# Patient Record
Sex: FEMALE | Employment: UNEMPLOYED | URBAN - METROPOLITAN AREA
[De-identification: names, ages, dates, MRNs, and addresses within clinical notes are randomized per-mention and may not be internally consistent; named-entity substitution may affect disease eponyms.]

---

## 2024-02-02 ENCOUNTER — HOSPITAL ENCOUNTER (EMERGENCY)
Facility: HOSPITAL | Age: 34
Discharge: HOME | End: 2024-02-03
Attending: EMERGENCY MEDICINE | Admitting: EMERGENCY MEDICINE
Payer: MEDICAID

## 2024-02-02 VITALS
HEART RATE: 99 BPM | SYSTOLIC BLOOD PRESSURE: 146 MMHG | HEIGHT: 66 IN | OXYGEN SATURATION: 98 % | BODY MASS INDEX: 44.2 KG/M2 | TEMPERATURE: 101.2 F | RESPIRATION RATE: 18 BRPM | WEIGHT: 275 LBS | DIASTOLIC BLOOD PRESSURE: 81 MMHG

## 2024-02-02 DIAGNOSIS — R55 VASOVAGAL SYNCOPE: ICD-10-CM

## 2024-02-02 DIAGNOSIS — J02.0 STREP PHARYNGITIS: ICD-10-CM

## 2024-02-02 LAB
ALBUMIN SERPL BCG-MCNC: 4.1 G/DL (ref 3.5–5.2)
ALP SERPL-CCNC: 106 U/L (ref 40–150)
ALT SERPL W P-5'-P-CCNC: 16 U/L (ref 0–50)
ANION GAP SERPL CALCULATED.3IONS-SCNC: 13 MMOL/L (ref 7–15)
AST SERPL W P-5'-P-CCNC: 22 U/L (ref 0–45)
BASOPHILS # BLD AUTO: 0 10E3/UL (ref 0–0.2)
BASOPHILS NFR BLD AUTO: 0 %
BILIRUB DIRECT SERPL-MCNC: <0.2 MG/DL (ref 0–0.3)
BILIRUB SERPL-MCNC: 0.7 MG/DL
BUN SERPL-MCNC: 11 MG/DL (ref 6–20)
CALCIUM SERPL-MCNC: 8.8 MG/DL (ref 8.6–10)
CHLORIDE SERPL-SCNC: 102 MMOL/L (ref 98–107)
CREAT SERPL-MCNC: 1.14 MG/DL (ref 0.51–0.95)
DEPRECATED HCO3 PLAS-SCNC: 25 MMOL/L (ref 22–29)
EGFRCR SERPLBLD CKD-EPI 2021: 65 ML/MIN/1.73M2
EOSINOPHIL # BLD AUTO: 0 10E3/UL (ref 0–0.7)
EOSINOPHIL NFR BLD AUTO: 0 %
ERYTHROCYTE [DISTWIDTH] IN BLOOD BY AUTOMATED COUNT: 13.7 % (ref 10–15)
FLUAV RNA SPEC QL NAA+PROBE: NEGATIVE
FLUBV RNA RESP QL NAA+PROBE: NEGATIVE
GLUCOSE SERPL-MCNC: 105 MG/DL (ref 70–99)
GROUP A STREP BY PCR: DETECTED
HCG SERPL QL: NEGATIVE
HCT VFR BLD AUTO: 41.6 % (ref 35–47)
HGB BLD-MCNC: 14.2 G/DL (ref 11.7–15.7)
HOLD SPECIMEN: NORMAL
IMM GRANULOCYTES # BLD: 0.1 10E3/UL
IMM GRANULOCYTES NFR BLD: 1 %
LYMPHOCYTES # BLD AUTO: 0.8 10E3/UL (ref 0.8–5.3)
LYMPHOCYTES NFR BLD AUTO: 8 %
MCH RBC QN AUTO: 26.2 PG (ref 26.5–33)
MCHC RBC AUTO-ENTMCNC: 34.1 G/DL (ref 31.5–36.5)
MCV RBC AUTO: 77 FL (ref 78–100)
MONOCYTES # BLD AUTO: 0.7 10E3/UL (ref 0–1.3)
MONOCYTES NFR BLD AUTO: 7 %
NEUTROPHILS # BLD AUTO: 8.2 10E3/UL (ref 1.6–8.3)
NEUTROPHILS NFR BLD AUTO: 84 %
NRBC # BLD AUTO: 0 10E3/UL
NRBC BLD AUTO-RTO: 0 /100
PLATELET # BLD AUTO: 238 10E3/UL (ref 150–450)
POTASSIUM SERPL-SCNC: 3.3 MMOL/L (ref 3.4–5.3)
PROT SERPL-MCNC: 8 G/DL (ref 6.4–8.3)
RBC # BLD AUTO: 5.42 10E6/UL (ref 3.8–5.2)
RSV RNA SPEC NAA+PROBE: NEGATIVE
SARS-COV-2 RNA RESP QL NAA+PROBE: NEGATIVE
SODIUM SERPL-SCNC: 140 MMOL/L (ref 135–145)
WBC # BLD AUTO: 9.7 10E3/UL (ref 4–11)

## 2024-02-02 PROCEDURE — 99284 EMERGENCY DEPT VISIT MOD MDM: CPT | Mod: 25

## 2024-02-02 PROCEDURE — 258N000003 HC RX IP 258 OP 636: Performed by: EMERGENCY MEDICINE

## 2024-02-02 PROCEDURE — 96374 THER/PROPH/DIAG INJ IV PUSH: CPT

## 2024-02-02 PROCEDURE — 250N000011 HC RX IP 250 OP 636: Performed by: STUDENT IN AN ORGANIZED HEALTH CARE EDUCATION/TRAINING PROGRAM

## 2024-02-02 PROCEDURE — 250N000013 HC RX MED GY IP 250 OP 250 PS 637: Performed by: EMERGENCY MEDICINE

## 2024-02-02 PROCEDURE — 93005 ELECTROCARDIOGRAM TRACING: CPT | Performed by: EMERGENCY MEDICINE

## 2024-02-02 PROCEDURE — 250N000011 HC RX IP 250 OP 636: Performed by: EMERGENCY MEDICINE

## 2024-02-02 PROCEDURE — 87651 STREP A DNA AMP PROBE: CPT | Performed by: EMERGENCY MEDICINE

## 2024-02-02 PROCEDURE — 93005 ELECTROCARDIOGRAM TRACING: CPT | Performed by: STUDENT IN AN ORGANIZED HEALTH CARE EDUCATION/TRAINING PROGRAM

## 2024-02-02 PROCEDURE — 80053 COMPREHEN METABOLIC PANEL: CPT | Performed by: EMERGENCY MEDICINE

## 2024-02-02 PROCEDURE — 258N000003 HC RX IP 258 OP 636: Performed by: STUDENT IN AN ORGANIZED HEALTH CARE EDUCATION/TRAINING PROGRAM

## 2024-02-02 PROCEDURE — 87637 SARSCOV2&INF A&B&RSV AMP PRB: CPT | Performed by: EMERGENCY MEDICINE

## 2024-02-02 PROCEDURE — 36415 COLL VENOUS BLD VENIPUNCTURE: CPT | Performed by: EMERGENCY MEDICINE

## 2024-02-02 PROCEDURE — 85025 COMPLETE CBC W/AUTO DIFF WBC: CPT | Performed by: EMERGENCY MEDICINE

## 2024-02-02 PROCEDURE — 96361 HYDRATE IV INFUSION ADD-ON: CPT

## 2024-02-02 PROCEDURE — 84703 CHORIONIC GONADOTROPIN ASSAY: CPT | Performed by: EMERGENCY MEDICINE

## 2024-02-02 RX ORDER — ACETAMINOPHEN 325 MG/1
650 TABLET ORAL ONCE
Status: COMPLETED | OUTPATIENT
Start: 2024-02-03 | End: 2024-02-02

## 2024-02-02 RX ORDER — ONDANSETRON 4 MG/1
4 TABLET, ORALLY DISINTEGRATING ORAL ONCE
Status: COMPLETED | OUTPATIENT
Start: 2024-02-02 | End: 2024-02-02

## 2024-02-02 RX ORDER — KETOROLAC TROMETHAMINE 15 MG/ML
15 INJECTION, SOLUTION INTRAMUSCULAR; INTRAVENOUS ONCE
Status: COMPLETED | OUTPATIENT
Start: 2024-02-02 | End: 2024-02-02

## 2024-02-02 RX ORDER — CEPHALEXIN 500 MG/1
500 CAPSULE ORAL ONCE
Status: COMPLETED | OUTPATIENT
Start: 2024-02-03 | End: 2024-02-02

## 2024-02-02 RX ADMIN — SODIUM CHLORIDE 500 ML: 9 INJECTION, SOLUTION INTRAVENOUS at 21:15

## 2024-02-02 RX ADMIN — KETOROLAC TROMETHAMINE 15 MG: 15 INJECTION, SOLUTION INTRAMUSCULAR; INTRAVENOUS at 22:24

## 2024-02-02 RX ADMIN — ACETAMINOPHEN 650 MG: 325 TABLET ORAL at 23:53

## 2024-02-02 RX ADMIN — ONDANSETRON 4 MG: 4 TABLET, ORALLY DISINTEGRATING ORAL at 21:02

## 2024-02-02 RX ADMIN — CEPHALEXIN 500 MG: 500 CAPSULE ORAL at 23:59

## 2024-02-02 RX ADMIN — SODIUM CHLORIDE 1000 ML: 9 INJECTION, SOLUTION INTRAVENOUS at 22:24

## 2024-02-02 ASSESSMENT — ACTIVITIES OF DAILY LIVING (ADL): ADLS_ACUITY_SCORE: 35

## 2024-02-03 LAB
ATRIAL RATE - MUSE: 105 BPM
DIASTOLIC BLOOD PRESSURE - MUSE: NORMAL MMHG
INTERPRETATION ECG - MUSE: NORMAL
P AXIS - MUSE: 60 DEGREES
PR INTERVAL - MUSE: 130 MS
QRS DURATION - MUSE: 82 MS
QT - MUSE: 348 MS
QTC - MUSE: 459 MS
R AXIS - MUSE: -53 DEGREES
SYSTOLIC BLOOD PRESSURE - MUSE: NORMAL MMHG
T AXIS - MUSE: 25 DEGREES
VENTRICULAR RATE- MUSE: 105 BPM

## 2024-02-03 RX ORDER — CEPHALEXIN 500 MG/1
500 CAPSULE ORAL 2 TIMES DAILY
Qty: 14 CAPSULE | Refills: 0 | Status: SHIPPED | OUTPATIENT
Start: 2024-02-03 | End: 2024-02-10

## 2024-02-03 RX ORDER — ONDANSETRON 4 MG/1
4 TABLET, ORALLY DISINTEGRATING ORAL EVERY 8 HOURS PRN
Qty: 12 TABLET | Refills: 0 | Status: SHIPPED | OUTPATIENT
Start: 2024-02-03

## 2024-02-03 NOTE — ED PROVIDER NOTES
EMERGENCY DEPARTMENT NOTE     Name: Reny Giang    Age/Sex: 33 year old female   MRN: [id number]   Evaluation Date & Time:  2/2/2024  9:20 PM    PCP:    Cyn Lay Girard   ED Provider: Sim Diaz D.O.       CHIEF COMPLAINT    Syncope, Nausea & Vomiting, and Fever       DIAGNOSIS & DISPOSITION/MEDICAL DECISION MAKING     1. Strep pharyngitis    2. Vasovagal syncope        Reny Giang is a 33 year old female with relevant past history of sickle cell trait, microcytic anemia, DVT, morbid obesity, who presents to the emergency department for evaluation of syncope.    Differential  diagnosis considered included but not limited to infectious process including COVID-19, influenza, streptococcal pharyngitis and with history of syncope considered traumatic process including ICH or subdural, subarachnoid hemorrhage,pulmonary embolism    Medical Decision Making  Patient on initial exam was febrile and mildly tachycardic.  No hypoxemia.  Head was atraumatic, cervical spine nontender.  Oropharynx with bilateral tonsillar enlargement erythema but no asymmetric swelling or exudates.  Cardiopulmonary exam normal.  Abdomen nontender.  ABC and comprehensive metabolic profile were within normal limits.  COVID influenza PCR negative , strep PCR positive.  Patient received IV ketorolac with resolution of headache.  IV fluids with resolution of tachycardia.  Patient received Tylenol with minimal reduction in temperature and also oral cephalexin.  History is most consistent with a vasovagal episode which she has had before.  Currently no significant headache and low suspicion for intracranial process including subdural, ICH or subarachnoid bleeding and further evaluation with CT/CTA head not pursued.  Patient reports no chest pain, shortness of breath and has not had cough and low suspicion for other process including pneumonia and further evaluation with chest x-ray not pursued.  Low suspicion for pulmonary  "embolism and evaluation with CTA not pursued.  Patient will be discharged continue cephalexin for 7 days.  Tylenol and ibuprofen for fever management.  Patient will follow-up with primary care physician next week.  Return criteria discussed with patient or develop any headaches not improved with Tylenol or ibuprofen, chest pain, shortness of breath, recurrent fainting will return to the emergency department.    Interventions:IV Fluids, ketorolac, oral acetaminophen, or Danza drawn and cephalexin  Discharge Vital Signs:BP (!) 146/81   Pulse 99   Temp (!) 101.2  F (38.4  C) (Oral)   Resp 18   Ht 1.676 m (5' 6\")   Wt 124.7 kg (275 lb)   SpO2 98%   BMI 44.39 kg/m       DISPOSITION: Home    Diagnostic studies:  Imaging:None  Lab:  Labs Ordered and Resulted from Time of ED Arrival to Time of ED Departure   BASIC METABOLIC PANEL - Abnormal       Result Value    Sodium 140      Potassium 3.3 (*)     Chloride 102      Carbon Dioxide (CO2) 25      Anion Gap 13      Urea Nitrogen 11.0      Creatinine 1.14 (*)     GFR Estimate 65      Calcium 8.8      Glucose 105 (*)    CBC WITH PLATELETS AND DIFFERENTIAL - Abnormal    WBC Count 9.7      RBC Count 5.42 (*)     Hemoglobin 14.2      Hematocrit 41.6      MCV 77 (*)     MCH 26.2 (*)     MCHC 34.1      RDW 13.7      Platelet Count 238      % Neutrophils 84      % Lymphocytes 8      % Monocytes 7      % Eosinophils 0      % Basophils 0      % Immature Granulocytes 1      NRBCs per 100 WBC 0      Absolute Neutrophils 8.2      Absolute Lymphocytes 0.8      Absolute Monocytes 0.7      Absolute Eosinophils 0.0      Absolute Basophils 0.0      Absolute Immature Granulocytes 0.1      Absolute NRBCs 0.0     GROUP A STREPTOCOCCUS PCR THROAT SWAB - Abnormal    Group A strep by PCR Detected (*)    HEPATIC FUNCTION PANEL - Normal    Protein Total 8.0      Albumin 4.1      Bilirubin Total 0.7      Alkaline Phosphatase 106      AST 22      ALT 16      Bilirubin Direct <0.20     INFLUENZA " A/B, RSV, & SARS-COV2 PCR - Normal    Influenza A PCR Negative      Influenza B PCR Negative      RSV PCR Negative      SARS CoV2 PCR Negative     HCG QUALITATIVE PREGNANCY - Normal    hCG Serum Qualitative Negative                 Triage note reviewed:Patient got home from work at 1430, was not feeling well and took a nap. She woke up and had an episode of emesis. After emesis she had a syncopal episode. She reports hitting her head. Denies thinners, cervical pain. Febrile in triage.      Triage Assessment (Adult)       Row Name 02/02/24 2043          Triage Assessment    Airway WDL WDL        Respiratory WDL    Respiratory WDL WDL        Peripheral/Neurovascular WDL    Peripheral Neurovascular WDL WDL        Cognitive/Neuro/Behavioral WDL    Cognitive/Neuro/Behavioral WDL WDL                         Medical Decision Making  Obtained supplemental history:Supplemental history obtained?: No  Reviewed external records: Primary care office visit November 6, 2023  Care impacted by chronic illness:Other: Sickle cell trait  Care significantly affected by social determinants of health:Access to Medical Care  Did you consider but not order tests?: Work up considered but not performed and documented in chart, if applicable  Did you interpret images independently?: Independent interpretation of ECG and images noted in documentation, when applicable.  Consultation discussion with other provider:  Discharge. I prescribed additional prescription strength medication(s) as charted. N/A.    At the conclusion of the encounter I discussed the results of all of the tests and the disposition. The questions were answered. The patient or family acknowledged understanding and was agreeable with the care plan.    TOTAL CRITICAL CARE TIME (EXCLUDING PROCEDURES): Not applicable    PROCEDURES:   None    EMERGENCY DEPARTMENT COURSE   9:43 PM I met with the patient to gather history and to perform my initial exam.  We discussed treatment  options and the plan for care while in the Emergency Department.    ED INTERVENTIONS     Medications   ondansetron (ZOFRAN ODT) ODT tab 4 mg (4 mg Oral $Given 2/2/24 2102)   sodium chloride 0.9% BOLUS 500 mL (0 mLs Intravenous Stopped 2/2/24 2342)   sodium chloride 0.9% BOLUS 1,000 mL (0 mLs Intravenous Stopped 2/2/24 2342)   ketorolac (TORADOL) injection 15 mg (15 mg Intravenous $Given 2/2/24 2224)   acetaminophen (TYLENOL) tablet 650 mg (650 mg Oral $Given 2/2/24 2353)   cephALEXin (KEFLEX) capsule 500 mg (500 mg Oral $Given 2/2/24 2359)       DISCHARGE MEDICATIONS        Review of your medicines        UNREVIEWED medicines. Ask your doctor about these medicines        Dose / Directions   fondaparinux ANTICOAGULANT 10 MG/0.8ML injection  Commonly known as: ARIXTRA      Dose: 10 mg  Inject 10 mg Subcutaneous every 24 hours Takes in the PM  Refills: 0     ibuprofen 600 MG tablet  Commonly known as: ADVIL/MOTRIN  Used for: Post-op pain      Dose: 600 mg  Take 1 tablet (600 mg) by mouth every 6 hours as needed for pain (mild)  Quantity: 20 tablet  Refills: 0     PRENATAL VITAMIN PO      Take by mouth every morning  Refills: 0            START taking        Dose / Directions   cephALEXin 500 MG capsule  Commonly known as: KEFLEX      Dose: 500 mg  Take 1 capsule (500 mg) by mouth 2 times daily for 7 days  Quantity: 14 capsule  Refills: 0     ondansetron 4 MG ODT tab  Commonly known as: ZOFRAN ODT      Dose: 4 mg  Take 1 tablet (4 mg) by mouth every 8 hours as needed for nausea  Quantity: 12 tablet  Refills: 0               Where to get your medicines        Some of these will need a paper prescription and others can be bought over the counter. Ask your nurse if you have questions.    Bring a paper prescription for each of these medications  cephALEXin 500 MG capsule  ondansetron 4 MG ODT tab           INFORMATION SOURCE AND LIMITATIONS    History/Exam limitations: None  Patient information was obtained from:  patient  Use of : N/A    HISTORY OF PRESENT ILLNESS   Reny Giang is a 33 year old year old female with a relevant past history of sickle cell trait, microcytic anemia, DVT, morbid obesity, who presents to this ED via EMS for evaluation of syncope.    Patient reports feeling fine while at work this today. When she returned home, she felt tired and fatigued so she took a 2 hour nap. When she woke up, she was braiding her brother's hair and then suddenly needed to use the restroom. While in the restroom, she developed sudden onset nausea and vomiting and shortly after had an unwitnessed syncopal episode. When she came to, she was short of breath. She is unsure if she hit her head. Since then, she endorses some chest pain, a posterior headache, and sore throat. EMS was called and she was given zofran en route with relief from nausea. She does have a history of syncope.    She otherwise denies associating diarrhea, fever, cough, shortness of breath, abdominal pain, difficulty urinating, neck pain, or extremity pain. She doesn't take birth control pills and has an IUD. She doesn't take any routine medications. There were no other concerns/complaints at this time.    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    REVIEW OF SYSTEMS:   All other systems reviewed and are negative except as noted above in HPI.    PATIENT HISTORY     Past Medical History:   Diagnosis Date    Chlamydia 2011, 2015, 2016    Depression 2010    History of blood transfusion     Hypercoagulable state, primary (H24) 10/2015    Microcytic anemia 2013    Obesity     Sickle cell trait (H24)     Thrombosis of leg     Trichomonas infection 2010     There is no problem list on file for this patient.    Past Surgical History:   Procedure Laterality Date    DILATION AND EVACUATION N/A 10/7/2016    Procedure: DILATION AND EVACUATION;  Surgeon: Angelique Craven MD;  Location: [facility] OR    INSERT INTRAUTERINE DEVICE N/A  10/7/2016    Procedure: INSERT INTRAUTERINE DEVICE;  Surgeon: Angelique Craven MD;  Location: [facility] OR    IR IVC FILTER REMOVAL  1/31/2017    NO HISTORY OF SURGERY         No Known Allergies    OUTPATIENT MEDICATIONS     Discharge Medication List as of 2/3/2024 12:18 AM        START taking these medications    Details   cephALEXin (KEFLEX) 500 MG capsule Take 1 capsule (500 mg) by mouth 2 times daily for 7 days, Disp-14 capsule, R-0, Local Print            Vitals:    02/02/24 2243 02/02/24 2258 02/02/24 2313 02/02/24 2328   BP: (!) 149/83 (!) 142/94 (!) 144/84 (!) 146/81   Pulse: 94 99 98 99   Resp:       Temp:       TempSrc:       SpO2: 98% 99% 98% 98%   Weight:       Height:           Physical Exam   Constitutional: Oriented to person, place, and time. Appears well-developed and well-nourished.   HEENT: Mild tonsillar enlargement and erythema. No exudate.   Head: Atraumatic.   Neck: Normal range of motion. Neck supple. Cervical spine non tender.  Cardiovascular: Normal rate, regular rhythm and normal heart sounds.    Pulmonary/Chest: Normal effort  and breath sounds normal.   Abdominal: Soft. Bowel sounds are normal.   Musculoskeletal: Normal range of motion.   Neurological: Alert and oriented to person, place, and time. Normal strength. No sensory deficit. No cranial nerve deficit.  Skin: Skin is warm and dry.   Psychiatric: Normal mood and affect. Behavior is normal. Thought content normal.     DIAGNOSTICS    LABORATORY FINDINGS (REVIEWED AND INTERPRETED):  Labs Ordered and Resulted from Time of ED Arrival to Time of ED Departure   BASIC METABOLIC PANEL - Abnormal       Result Value    Sodium 140      Potassium 3.3 (*)     Chloride 102      Carbon Dioxide (CO2) 25      Anion Gap 13      Urea Nitrogen 11.0      Creatinine 1.14 (*)     GFR Estimate 65      Calcium 8.8      Glucose 105 (*)    CBC WITH PLATELETS AND DIFFERENTIAL - Abnormal    WBC Count 9.7      RBC Count 5.42 (*)     Hemoglobin 14.2       Hematocrit 41.6      MCV 77 (*)     MCH 26.2 (*)     MCHC 34.1      RDW 13.7      Platelet Count 238      % Neutrophils 84      % Lymphocytes 8      % Monocytes 7      % Eosinophils 0      % Basophils 0      % Immature Granulocytes 1      NRBCs per 100 WBC 0      Absolute Neutrophils 8.2      Absolute Lymphocytes 0.8      Absolute Monocytes 0.7      Absolute Eosinophils 0.0      Absolute Basophils 0.0      Absolute Immature Granulocytes 0.1      Absolute NRBCs 0.0     GROUP A STREPTOCOCCUS PCR THROAT SWAB - Abnormal    Group A strep by PCR Detected (*)    HEPATIC FUNCTION PANEL - Normal    Protein Total 8.0      Albumin 4.1      Bilirubin Total 0.7      Alkaline Phosphatase 106      AST 22      ALT 16      Bilirubin Direct <0.20     INFLUENZA A/B, RSV, & SARS-COV2 PCR - Normal    Influenza A PCR Negative      Influenza B PCR Negative      RSV PCR Negative      SARS CoV2 PCR Negative     HCG QUALITATIVE PREGNANCY - Normal    hCG Serum Qualitative Negative           IMAGING (REVIEWED AND INTERPRETED):  No orders to display     EKG:  Sinus tachycardia rate 105 bpm QTc 459 ms.  No ST segment elevation or depression.  T wave inversion in V3 through V6  Compared to EKG October 2015 sinus tachycardia replaces sinus rhythm with increase in heart rate 42 bpm T Twave inversion unchanged          I, Milena Juarez, am serving as a scribe to document services personally performed by Sim Diaz D.O., based on my observation and the provider s statements to me.    I, Sim Diaz D.O., attest that Milena Juarez is acting in a scribe capacity, has observed my performance of the services and has documented them in accordance with my direction.    Sim Diaz D.O.  EMERGENCY MEDICINE   02/02/24  [facility] EMERGENCY DEPARTMENT  [address] [place] [id number] [phone]  Dept: [phone]     Sim Diaz,   02/04/24 0304       Sim Diaz DO  02/04/24 4462

## 2024-02-03 NOTE — ED TRIAGE NOTES
Patient got home from work at 1430, was not feeling well and took a nap. She woke up and had an episode of emesis. After emesis she had a syncopal episode. She reports hitting her head. Denies thinners, cervical pain. Febrile in triage.      Triage Assessment (Adult)       Row Name 02/02/24 2043          Triage Assessment    Airway WDL WDL        Respiratory WDL    Respiratory WDL WDL        Peripheral/Neurovascular WDL    Peripheral Neurovascular WDL WDL        Cognitive/Neuro/Behavioral WDL    Cognitive/Neuro/Behavioral WDL WDL

## 2024-02-03 NOTE — DISCHARGE INSTRUCTIONS
Continue cephalexin for 7 days.  Take ibuprofen 400 mg every 8 hours and Tylenol 650 m g every 6 hours for pain management.  Use Zofran as needed for nausea.  Return to the emergency department if you have severe sore throat not improved with Tylenol or ibuprofen, high fever not improved with Tylenol, vomiting despite use of Zofran or recurrent fainting.  Otherwise follow-up with your primary care physician if there is any persistent symptoms after further 5 days.